# Patient Record
Sex: MALE
[De-identification: names, ages, dates, MRNs, and addresses within clinical notes are randomized per-mention and may not be internally consistent; named-entity substitution may affect disease eponyms.]

---

## 2022-04-12 ENCOUNTER — NURSE TRIAGE (OUTPATIENT)
Dept: OTHER | Facility: CLINIC | Age: 32
End: 2022-04-12

## 2022-10-16 ENCOUNTER — NURSE TRIAGE (OUTPATIENT)
Dept: OTHER | Facility: CLINIC | Age: 32
End: 2022-10-16

## 2022-10-16 NOTE — TELEPHONE ENCOUNTER
Location of patient: [place]    Subjective: Caller states \"Thinks he has COVID and feels like he needs to be segregated and can be tested [facility]\"     Current Symptoms: fatigue, body aches, stuffy nose, cough, swelling in ears    Associated Symptoms: reduced activity    Pain Severity: 7/10; aching; constant    Temperature: feels warm by unknown method    What has been tried: none    LMP: NA Pregnant: NA    Recommended disposition: 3-4 hours in Urgent Care     Care advice provided, patient verbalizes understanding; denies any other questions or concerns.     Outcome:     Reason for Disposition   MILD difficulty breathing (e.g., minimal/no SOB at rest, SOB with walking, pulse <100)    Protocols used: Coronavirus (COVID-19) Diagnosed or Suspected-[facility]